# Patient Record
Sex: MALE | Race: WHITE | NOT HISPANIC OR LATINO | Employment: FULL TIME | URBAN - METROPOLITAN AREA
[De-identification: names, ages, dates, MRNs, and addresses within clinical notes are randomized per-mention and may not be internally consistent; named-entity substitution may affect disease eponyms.]

---

## 2017-01-02 RX ORDER — PERMETHRIN 50 MG/G
CREAM TOPICAL ONCE
Qty: 60 G | Refills: 0 | Status: SHIPPED | OUTPATIENT
Start: 2017-01-02 | End: 2017-01-02

## 2017-03-06 RX ORDER — COLCHICINE 0.6 MG/1
TABLET ORAL
Qty: 6 TABLET | Refills: 0 | Status: SHIPPED | OUTPATIENT
Start: 2017-03-06 | End: 2017-07-18 | Stop reason: SDUPTHER

## 2017-03-14 RX ORDER — ESZOPICLONE 2 MG/1
2 TABLET, FILM COATED ORAL NIGHTLY PRN
Qty: 30 TABLET | Refills: 2 | Status: SHIPPED | OUTPATIENT
Start: 2017-03-14 | End: 2017-06-06 | Stop reason: SDUPTHER

## 2017-03-14 RX ORDER — ESZOPICLONE 2 MG/1
TABLET, FILM COATED ORAL
Qty: 30 TABLET | Refills: 2 | Status: CANCELLED | OUTPATIENT
Start: 2017-03-14

## 2017-03-31 RX ORDER — GUANFACINE 2 MG/1
TABLET ORAL
Qty: 90 TABLET | Refills: 2 | Status: SHIPPED | OUTPATIENT
Start: 2017-03-31 | End: 2017-06-29 | Stop reason: SDUPTHER

## 2017-06-06 ENCOUNTER — TELEPHONE (OUTPATIENT)
Dept: FAMILY MEDICINE | Facility: CLINIC | Age: 47
End: 2017-06-06

## 2017-06-06 RX ORDER — ESZOPICLONE 2 MG/1
2 TABLET, FILM COATED ORAL NIGHTLY PRN
Qty: 90 TABLET | Refills: 3 | Status: SHIPPED | OUTPATIENT
Start: 2017-06-06 | End: 2017-06-19 | Stop reason: SDUPTHER

## 2017-06-06 NOTE — TELEPHONE ENCOUNTER
The patient's prescription for Lunesta has been faxed to the [facility] that he has on file.Thanks.

## 2017-06-06 NOTE — TELEPHONE ENCOUNTER
Patient's insurance is requiring the patient to receive a 90 day supply of Lunesta.Please advise.Thanks.

## 2017-06-06 NOTE — TELEPHONE ENCOUNTER
----- Message from Anahi Kuhn sent at 6/6/2017 10:21 AM CDT -----  Pt called and stated he need Dr Garcia to call [facility] need her to give them a call to authroized 90 days supplies of Lanesta.  Please contact [facility] at [phone]

## 2017-06-09 ENCOUNTER — TELEPHONE (OUTPATIENT)
Dept: FAMILY MEDICINE | Facility: CLINIC | Age: 47
End: 2017-06-09

## 2017-06-09 NOTE — TELEPHONE ENCOUNTER
----- Message from Mariama Kirk sent at 6/9/2017  8:38 AM CDT -----  Contact: Pt  Pt has a refill of Lunesta from Dr Garcia. He says his Insurance Co will pay for 60 pills but would like to be called to get the other 30 approved.

## 2017-06-09 NOTE — TELEPHONE ENCOUNTER
Patient states that his insurance will only cover 60 tablets of Lunesta.  If you are okay with prescribing 60 tabs please send over a new prescription to his pharmacy. thanks

## 2017-06-19 RX ORDER — ESZOPICLONE 2 MG/1
2 TABLET, FILM COATED ORAL NIGHTLY PRN
Qty: 60 TABLET | Refills: 3 | Status: SHIPPED | OUTPATIENT
Start: 2017-06-19 | End: 2017-06-29 | Stop reason: ALTCHOICE

## 2017-06-19 NOTE — TELEPHONE ENCOUNTER
I have called in the prescription refill and did prior authorization with [facility] for the patient's Lunesta.Patient has been notified of approval.Thanks.

## 2017-06-29 ENCOUNTER — OFFICE VISIT (OUTPATIENT)
Dept: INTERNAL MEDICINE | Facility: CLINIC | Age: 47
End: 2017-06-29
Payer: COMMERCIAL

## 2017-06-29 ENCOUNTER — TELEPHONE (OUTPATIENT)
Dept: INTERNAL MEDICINE | Facility: CLINIC | Age: 47
End: 2017-06-29

## 2017-06-29 VITALS
WEIGHT: 220.69 LBS | SYSTOLIC BLOOD PRESSURE: 112 MMHG | DIASTOLIC BLOOD PRESSURE: 96 MMHG | BODY MASS INDEX: 29.25 KG/M2 | HEIGHT: 73 IN | OXYGEN SATURATION: 96 % | HEART RATE: 50 BPM

## 2017-06-29 DIAGNOSIS — I10 ESSENTIAL HYPERTENSION: ICD-10-CM

## 2017-06-29 DIAGNOSIS — I10 ESSENTIAL HYPERTENSION: Primary | ICD-10-CM

## 2017-06-29 DIAGNOSIS — F51.04 CHRONIC INSOMNIA: ICD-10-CM

## 2017-06-29 DIAGNOSIS — Z78.9 EXCESSIVE CAFFEINE INTAKE: ICD-10-CM

## 2017-06-29 DIAGNOSIS — Z00.00 ANNUAL PHYSICAL EXAM: Primary | ICD-10-CM

## 2017-06-29 DIAGNOSIS — Z71.84 COUNSELING ABOUT TRAVEL: ICD-10-CM

## 2017-06-29 DIAGNOSIS — M10.9 GOUT, UNSPECIFIED CAUSE, UNSPECIFIED CHRONICITY, UNSPECIFIED SITE: ICD-10-CM

## 2017-06-29 DIAGNOSIS — H66.91 OTITIS MEDIA FOLLOW-UP, NOT RESOLVED, RIGHT: ICD-10-CM

## 2017-06-29 PROCEDURE — 99999 PR PBB SHADOW E&M-EST. PATIENT-LVL III: CPT | Mod: PBBFAC,,, | Performed by: NURSE PRACTITIONER

## 2017-06-29 PROCEDURE — 99386 PREV VISIT NEW AGE 40-64: CPT | Mod: S$GLB,,, | Performed by: NURSE PRACTITIONER

## 2017-06-29 RX ORDER — AMOXICILLIN 500 MG/1
500 TABLET, FILM COATED ORAL EVERY 12 HOURS
Qty: 20 TABLET | Refills: 0 | Status: SHIPPED | OUTPATIENT
Start: 2017-06-29 | End: 2017-07-09

## 2017-06-29 RX ORDER — GUANFACINE 2 MG/1
TABLET ORAL
Qty: 90 TABLET | Refills: 2
Start: 2017-06-29

## 2017-06-29 RX ORDER — CIPROFLOXACIN 500 MG/1
500 TABLET ORAL EVERY 12 HOURS
Qty: 28 TABLET | Refills: 0 | Status: SHIPPED | OUTPATIENT
Start: 2017-06-29

## 2017-06-29 RX ORDER — AZITHROMYCIN 250 MG/1
TABLET, FILM COATED ORAL
COMMUNITY
Start: 2017-03-22 | End: 2017-06-29 | Stop reason: ALTCHOICE

## 2017-06-29 RX ORDER — LISINOPRIL 5 MG/1
5 TABLET ORAL DAILY
Qty: 30 TABLET | Refills: 0 | Status: SHIPPED | OUTPATIENT
Start: 2017-06-29 | End: 2017-09-21 | Stop reason: SDUPTHER

## 2017-06-29 RX ORDER — ZOLPIDEM TARTRATE 10 MG/1
10 TABLET ORAL NIGHTLY PRN
Qty: 30 TABLET | Refills: 2 | Status: SHIPPED | OUTPATIENT
Start: 2017-06-29 | End: 2017-09-21 | Stop reason: ALTCHOICE

## 2017-06-29 NOTE — TELEPHONE ENCOUNTER
Resume lisinopril at 5 mg dosing daily for better control of BP. Pt concerned that BP is not optimally controlled. RTC for BP management in 4 weeks with BP log.

## 2017-06-29 NOTE — PROGRESS NOTES
Subjective:       Patient ID: Nasir Mendes is a 46 y.o. male.    Chief Complaint: Annual Exam    Generally well.     Pt's BP is generally well controlled. Currently taking Tenex 2 mg daily.   Tolerating meds well. Pt denies cp/sob/ha/vision or neuro changes. Checking at home and is well controlled, SBP <125 and DBP <85.     Gout is controlled. No flare ups since last one 6 months ago. Has colchicine prn. Mindful of diet.     Insomnia, previously well controlled with Lunesta. Now finds sleep initiation is longer. Admits drinking 10 cups of caffeinated coffee daily. Has taken Ambien in past with great success. Mainly has sleep med for travel - travels extensively regionally and internationally.     Diagnosed with right otitis media in [place] last week and completed 5-day course of azithromycin yesterday. Symptoms of right ear pain and fullness persist. Denies fevers, chills, n/v, sob, cp.     Travelling to [place] soon and would like travel abx to have handy during travel. Travels internationally regularly and immunizations up to date.     HPI  Review of Systems   Constitutional: Negative.    HENT: Positive for ear pain. Negative for hearing loss.    Eyes: Negative.    Respiratory: Negative.    Cardiovascular: Negative.    Gastrointestinal: Negative.    Endocrine: Negative.    Genitourinary: Negative.    Musculoskeletal: Negative.    Allergic/Immunologic: Negative.    Neurological: Negative.    Hematological: Negative.    Psychiatric/Behavioral: Positive for sleep disturbance. Negative for dysphoric mood.        Sleep initiation, chronic        Objective:      Physical Exam   Constitutional: He is oriented to person, place, and time. He appears well-developed and well-nourished.   HENT:   Head: Normocephalic and atraumatic.   Right Ear: Hearing and external ear normal. There is tenderness. Tympanic membrane is erythematous.   Left Ear: Hearing, tympanic membrane, external ear and ear canal normal.   Eyes:  Conjunctivae and EOM are normal. Pupils are equal, round, and reactive to light. Right eye exhibits no discharge. Left eye exhibits no discharge. No scleral icterus.   Neck: Normal range of motion. Neck supple. No JVD present. No tracheal deviation present. No thyromegaly present.   Cardiovascular: Normal rate, regular rhythm, normal heart sounds and intact distal pulses.    Pulmonary/Chest: Effort normal and breath sounds normal.   Abdominal: Soft. Bowel sounds are normal. He exhibits no distension. There is no tenderness. There is no guarding. No hernia.   Genitourinary:   Genitourinary Comments: declined   Musculoskeletal: Normal range of motion.   Lymphadenopathy:     He has no cervical adenopathy.   Neurological: He is alert and oriented to person, place, and time.   Skin: Skin is warm and dry.   Psychiatric: His behavior is normal.       Assessment:       1. Annual physical exam    2. Chronic insomnia    3. Excessive caffeine intake    4. Counseling about travel    5. Otitis media follow-up, not resolved, right    6. Essential hypertension    7. Gout, unspecified cause, unspecified chronicity, unspecified site        Plan:       Annual labs. Pt prefers to schedule at [facility].   Chronic insomnia - switch Lunesta to Ambien; proper use and side effects d/w pt.  Counseled on sleep hygiene - explicitly about decreasing caffeine intake (relationship with sleep initiation and occasionally elevated BP)  Travel - Cipro rx   Unresolved otitis media, right - Amoxicillin 500 mg po BID x 10 days. F/U failure to improve. Consider ENT then.   Continue daily exercise.

## 2017-06-29 NOTE — TELEPHONE ENCOUNTER
Spoke with pt. He is still requesting for Lisinopril for BP because he says the TENEX is not affective enough.    Schedule for 7/27 4 wks f/u BP management with Nicki.    Pt was advised to keep a BP log    Pharmacy [facility]

## 2017-06-29 NOTE — TELEPHONE ENCOUNTER
Spoke with pt, he stated that he spoke with you about starting on lisinopril to help him control his BP    He is waiting at [facility] pharmacy for his RX.  At [address]

## 2017-06-29 NOTE — TELEPHONE ENCOUNTER
----- Message from Irena Mustafa sent at 6/29/2017 10:20 AM CDT -----  Contact: pt  _  1st Request  _  2nd Request  _  3rd Request    Please refill the medication(s) listed below. Please call the patient when the prescription(s) is ready for  at the phone number (___)(___-_____) .[phone]    Medication #1Lisinipril     Medication #2    Pt is at the pharmacy waiting    [facility] [address]

## 2017-07-05 ENCOUNTER — TELEPHONE (OUTPATIENT)
Dept: SLEEP MEDICINE | Facility: CLINIC | Age: 47
End: 2017-07-05

## 2017-07-05 NOTE — TELEPHONE ENCOUNTER
Spoke with Liv from [facility]. She filled out the prior authorization and it was announced denied pt RX Zolpidem (ambien) because Rx QTY exceeded requirement by insurance.      Spoke with Arabella an appeal specialist she stated many things but suggested that provider needs to do a standard appeal by witting a letter stating pt medical necessity for needing this medication. I stated pt needed it for chronic insomnia, which means that he can not sleep at night without med.  It did not seem to mean any thing.        Standard writing appeal should be fax to [phone] Attention appeal:

## 2017-07-05 NOTE — TELEPHONE ENCOUNTER
----- Message from Nisha Talamantes MA sent at 7/5/2017 10:44 AM CDT -----  Contact: [facility]/pharmacy #[id number]   LOV 6/29/17  ----- Message -----  From: Sally aYn  Sent: 7/5/2017  10:16 AM  To: Joey Caceres Staff    x_  1st Request  _  2nd Request  _  3rd Request        Who: [facility]/pharmacy #[id number]     Why: Pharmacy is calling to check the status of a PA on Rx zolpidem (AMBIEN) for the patient    What Number to Call Back: [phone]     When to Expect a call back: (Before the end of the day)   -- if call after 3:00 call back will be tomorrow.

## 2017-07-05 NOTE — TELEPHONE ENCOUNTER
Ovena, please refer to 'Medications', medication in question was refilled on 06/29/2017 plus 2 additional refills. If it required prior authorization, did you work on this?

## 2017-07-06 ENCOUNTER — TELEPHONE (OUTPATIENT)
Dept: INTERNAL MEDICINE | Facility: CLINIC | Age: 47
End: 2017-07-06

## 2017-07-06 NOTE — TELEPHONE ENCOUNTER
----- Message from Nisha Talamantes MA sent at 7/5/2017  3:32 PM CDT -----  PT case#[id number]         Spoke with Liv from [facility]. She filled out the prior authorization and it was announced denied pt RX Zolpidem (ambien) because Rx QTY exceeded requirement by insurance.       Spoke with Arabella an appeal specialist she stated many things but suggested that provider needs to do a standard appeal by witting a letter stating pt medical necessity for needing this medication. I stated pt needed it for chronic insomnia, which means that he can not sleep at night without med.  It did not seem to mean any thing.         Standard writing appeal should be fax to [phone] Attention appeal:         Patient Calls     NARCISO Calvin 4 hours ago (10:54 AM)       Nisha, please refer to 'Medications', medication in question was refilled on 06/29/2017 plus 2 additional refills. If it required prior authorization, did you work on this?   (Routing comment)     William Cook NP 4 hours ago (10:53 AM)       Nisha, please refer to 'Medications', medication in question was refilled on 06/29/2017 plus 2 additional refills. If it required prior authorization, did you work on this?     Documentation     William Cook NP 4 hours ago (10:53 AM)       ----- Message from Nihsa Talamantes MA sent at 7/5/2017 10:44 AM CDT -----   Contact: [facility]/pharmacy #[id number]   LOV 6/29/17   Previous Messages        ----- Message -----   From: Sally Yan   Sent: 7/5/2017  10:16 AM   To: Joey Caceres Staff       x_  1st Request   _  2nd Request   _  3rd Request               Who: [facility]/pharmacy #[id number]       Why: Pharmacy is calling to check the status of a PA on Rx zolpidem (AMBIEN) for the patient       What Number to Call Back: [phone]       When to Expect a call back: (Before the end of the day)                         -- if call after 3:00 call back will be tomorrow.

## 2017-07-06 NOTE — TELEPHONE ENCOUNTER
Please notify pt that his insurance won't cover Ambien. He can go back to taking Lunesta as before. As per our clinic conversation, significantly decreasing his coffee intake and timing should help sleep initiation and also blood pressure.

## 2017-07-07 ENCOUNTER — TELEPHONE (OUTPATIENT)
Dept: INTERNAL MEDICINE | Facility: CLINIC | Age: 47
End: 2017-07-07

## 2017-07-07 NOTE — TELEPHONE ENCOUNTER
----- Message from Kathie Guaman MA sent at 7/7/2017 11:22 AM CDT -----  Contact: [facility]  It's been submitted for insurance authorization.  They are just waiting for a reply.  If it isn't approved, I am sure we will be notified.    ----- Message -----  From: William Cook NP  Sent: 7/7/2017  10:31 AM  To: DIVINE Duke,    I believe Nisha has tried to address this, and Ambien is not covered by insurance. Although I don't think she asked alternative treatments that are covered. He has previously been on Lunesta and it is not as effective so I prescribed Ambien since he had success with that in the past. Could you find out which therapy is covered either from insurance or the pt to see if he has inquired. Pt could go back to Lunesta at increased dose of 3 mg which is recommended max, if he is okay with that.     -sv  ----- Message -----  From: Kathie Guaman MA  Sent: 7/7/2017   9:56 AM  To: William Cook NP    Would you know who would be the PA to assist me with this?  ----- Message -----  From: Andrew Bolanos  Sent: 7/7/2017   9:45 AM  To: Joey Caceres Staff    x_ 1st Request   _ 2nd Request   _ 3rd Request     Who: EVAN ARAUJO [[id number]]    Why: [facility] is requesting a call back in reference to PA request for Rx zolpidem (AMBIEN) 10 mg Tab    What Number to Call Back: [phone]     When to Expect a call back: (Before the end of the day)   -- if call after 3:00 call back will be tomorrow.

## 2017-07-10 ENCOUNTER — TELEPHONE (OUTPATIENT)
Dept: SLEEP MEDICINE | Facility: CLINIC | Age: 47
End: 2017-07-10

## 2017-07-10 NOTE — TELEPHONE ENCOUNTER
----- Message from Andrew Bolanos sent at 7/7/2017  9:45 AM CDT -----  Contact: [facility]  x_ 1st Request   _ 2nd Request   _ 3rd Request     Who: EVAN ARAUJO [[id number]]    Why: [facility] is requesting a call back in reference to PA request for Rx zolpidem (AMBIEN) 10 mg Tab    What Number to Call Back: [phone]     When to Expect a call back: (Before the end of the day)   -- if call after 3:00 call back will be tomorrow.

## 2017-07-10 NOTE — TELEPHONE ENCOUNTER
Me          7/5/17 3:01 PM   Note      Spoke with Liv from [facility]. She filled out the prior authorization and it was announced denied pt RX Zolpidem (ambien) because Rx QTY exceeded requirement by insurance.       Spoke with Arabella an appeal specialist she stated many things but suggested that provider needs to do a standard appeal by witting a letter stating pt medical necessity for needing this medication. I stated pt needed it for chronic insomnia, which means that he can not sleep at night without med.  It did not seem to mean any thing.          Standard writing appeal should be fax to [phone] Attention appeal:

## 2017-07-18 NOTE — TELEPHONE ENCOUNTER
----- Message from Vianney Diggs sent at 7/18/2017  2:30 PM CDT -----  Contact: Patient himself  X  1st Request  _  2nd Request  _  3rd Request    Please refill the medication(s) listed below. Please call the patient when the prescription(s) is ready for  at the phone number [phone] .    Medication #1  Colchicine  0.6 mg    Preferred Pharmacy: [facility] # [id number] - [place] - [address]# [phone]  /  Fax# [phone]

## 2017-07-19 RX ORDER — COLCHICINE 0.6 MG/1
TABLET ORAL
Qty: 6 TABLET | Refills: 6 | Status: SHIPPED | OUTPATIENT
Start: 2017-07-19 | End: 2017-07-20 | Stop reason: SDUPTHER

## 2017-07-20 RX ORDER — COLCHICINE 0.6 MG/1
TABLET ORAL
Qty: 6 TABLET | Refills: 6 | Status: SHIPPED | OUTPATIENT
Start: 2017-07-20

## 2017-07-20 NOTE — TELEPHONE ENCOUNTER
----- Message from Amy Cordero sent at 7/20/2017  1:23 PM CDT -----  _  1st Request  _  2nd Request  _  3rd Request    Please refill the medication(s) listed below. Please call the patient when the prescription(s) is ready for  at the phone number  [phone]    Medication #1colchicine 0.6 mg tablet 6 tablet     Medication #2      Preferred Pharmacy:

## 2017-07-26 DIAGNOSIS — I10 ESSENTIAL HYPERTENSION: ICD-10-CM

## 2017-07-26 RX ORDER — LISINOPRIL 5 MG/1
TABLET ORAL
Qty: 30 TABLET | Refills: 0 | OUTPATIENT
Start: 2017-07-26

## 2017-09-21 ENCOUNTER — TELEPHONE (OUTPATIENT)
Dept: FAMILY MEDICINE | Facility: CLINIC | Age: 47
End: 2017-09-21

## 2017-09-21 DIAGNOSIS — I10 ESSENTIAL HYPERTENSION: ICD-10-CM

## 2017-09-21 RX ORDER — LISINOPRIL 5 MG/1
5 TABLET ORAL DAILY
Qty: 30 TABLET | Refills: 0 | OUTPATIENT
Start: 2017-09-21 | End: 2018-09-21

## 2017-09-21 RX ORDER — ESZOPICLONE 2 MG/1
2 TABLET, FILM COATED ORAL NIGHTLY
Qty: 30 TABLET | Refills: 0 | OUTPATIENT
Start: 2017-09-21 | End: 2017-10-21

## 2017-09-21 RX ORDER — ESZOPICLONE 2 MG/1
2 TABLET, FILM COATED ORAL NIGHTLY PRN
Qty: 30 TABLET | Refills: 0 | Status: SHIPPED | OUTPATIENT
Start: 2017-09-21 | End: 2017-10-21

## 2017-09-21 RX ORDER — LISINOPRIL 5 MG/1
5 TABLET ORAL DAILY
Qty: 90 TABLET | Refills: 0 | Status: SHIPPED | OUTPATIENT
Start: 2017-09-21 | End: 2017-09-22 | Stop reason: SDUPTHER

## 2017-09-21 NOTE — TELEPHONE ENCOUNTER
Patient's prescription for lunesta was phoned into his pharmacy.The patient will be notified.Thanks.

## 2017-09-22 DIAGNOSIS — I10 ESSENTIAL HYPERTENSION: ICD-10-CM

## 2017-09-23 RX ORDER — LISINOPRIL 5 MG/1
5 TABLET ORAL DAILY
Qty: 90 TABLET | Refills: 0 | Status: SHIPPED | OUTPATIENT
Start: 2017-09-23 | End: 2018-09-23

## 2017-12-22 RX ORDER — ALLOPURINOL 100 MG/1
TABLET ORAL
Qty: 90 TABLET | Refills: 0 | Status: SHIPPED | OUTPATIENT
Start: 2017-12-22 | End: 2018-03-30 | Stop reason: SDUPTHER

## 2018-03-31 RX ORDER — ALLOPURINOL 100 MG/1
TABLET ORAL
Qty: 90 TABLET | Refills: 0 | Status: SHIPPED | OUTPATIENT
Start: 2018-03-31